# Patient Record
Sex: MALE | Race: OTHER | Employment: OTHER | URBAN - METROPOLITAN AREA
[De-identification: names, ages, dates, MRNs, and addresses within clinical notes are randomized per-mention and may not be internally consistent; named-entity substitution may affect disease eponyms.]

---

## 2019-03-13 ENCOUNTER — CATARACT CONSULT (OUTPATIENT)
Dept: URBAN - METROPOLITAN AREA CLINIC 39 | Facility: CLINIC | Age: 74
End: 2019-03-13

## 2019-03-13 VITALS
HEART RATE: 66 BPM | SYSTOLIC BLOOD PRESSURE: 138 MMHG | RESPIRATION RATE: 14 BRPM | HEIGHT: 60 IN | DIASTOLIC BLOOD PRESSURE: 67 MMHG

## 2019-03-13 DIAGNOSIS — H35.371: ICD-10-CM

## 2019-03-13 DIAGNOSIS — H43.813: ICD-10-CM

## 2019-03-13 DIAGNOSIS — H25.811: ICD-10-CM

## 2019-03-13 DIAGNOSIS — H25.812: ICD-10-CM

## 2019-03-13 PROCEDURE — 92014 COMPRE OPH EXAM EST PT 1/>: CPT

## 2019-03-13 PROCEDURE — V2799I IMPRIMIS

## 2019-03-13 PROCEDURE — 92015 DETERMINE REFRACTIVE STATE: CPT

## 2019-03-13 PROCEDURE — 92136TC INTERFEROMETRY - TECHNICAL COMPONENT

## 2019-03-13 PROCEDURE — 92025-2 CORNEAL TOPOGRAPHY, PT

## 2019-03-13 PROCEDURE — 92134 CPTRZ OPH DX IMG PST SGM RTA: CPT

## 2019-03-13 RX ORDER — BROMFENAC SODIUM 0.7 MG/ML: 1 SOLUTION/ DROPS OPHTHALMIC TWICE A DAY

## 2019-03-13 ASSESSMENT — TONOMETRY
OD_IOP_MMHG: 13
OS_IOP_MMHG: 13

## 2019-03-13 ASSESSMENT — VISUAL ACUITY
OD_SC: 20/25
OD_SC: J10-
OD_CC: J1
OS_SC: 20/70+1
OS_SC: J12-
OS_CC: J8

## 2019-03-28 ENCOUNTER — SURGERY/PROCEDURE (OUTPATIENT)
Dept: URBAN - METROPOLITAN AREA CLINIC 39 | Facility: CLINIC | Age: 74
End: 2019-03-28

## 2019-03-28 ENCOUNTER — PRE-OP/H&P (OUTPATIENT)
Dept: URBAN - METROPOLITAN AREA SURGERY 14 | Facility: SURGERY | Age: 74
End: 2019-03-28

## 2019-03-28 DIAGNOSIS — H25.811: ICD-10-CM

## 2019-03-28 DIAGNOSIS — H43.813: ICD-10-CM

## 2019-03-28 DIAGNOSIS — Z97.3: ICD-10-CM

## 2019-03-28 DIAGNOSIS — H25.812: ICD-10-CM

## 2019-03-28 PROCEDURE — 99499 UNLISTED E&M SERVICE: CPT

## 2019-03-28 PROCEDURE — 66999LNSR LENSAR LASER FOR CAT SX

## 2019-03-28 PROCEDURE — 66984CV REMOVE CATARACT, INSERT LENS, CUSTOM VISION

## 2019-03-29 ENCOUNTER — CATARACT POST-OP 1-DAY (OUTPATIENT)
Dept: URBAN - METROPOLITAN AREA CLINIC 39 | Facility: CLINIC | Age: 74
End: 2019-03-29

## 2019-03-29 DIAGNOSIS — Z96.1: ICD-10-CM

## 2019-03-29 PROCEDURE — 99024 POSTOP FOLLOW-UP VISIT: CPT

## 2019-03-29 ASSESSMENT — TONOMETRY: OD_IOP_MMHG: 14

## 2019-03-29 ASSESSMENT — VISUAL ACUITY: OD_SC: 20/40+1

## 2019-04-02 ENCOUNTER — POST OP/EVAL OF SECOND EYE (OUTPATIENT)
Dept: URBAN - METROPOLITAN AREA CLINIC 39 | Facility: CLINIC | Age: 74
End: 2019-04-02

## 2019-04-02 DIAGNOSIS — H25.812: ICD-10-CM

## 2019-04-02 DIAGNOSIS — Z96.1: ICD-10-CM

## 2019-04-02 PROCEDURE — 99024 POSTOP FOLLOW-UP VISIT: CPT

## 2019-04-02 PROCEDURE — 92012 INTRM OPH EXAM EST PATIENT: CPT

## 2019-04-02 ASSESSMENT — VISUAL ACUITY
OS_PH: 20/30+2
OD_SC: 20/20
OS_SC: 20/70
OD_SC: J12
OS_SC: >J12
OS_CC: J8

## 2019-04-02 ASSESSMENT — TONOMETRY
OD_IOP_MMHG: 10
OS_IOP_MMHG: 13

## 2019-04-04 ENCOUNTER — SURGERY/PROCEDURE (OUTPATIENT)
Dept: URBAN - METROPOLITAN AREA CLINIC 39 | Facility: CLINIC | Age: 74
End: 2019-04-04

## 2019-04-04 ENCOUNTER — PRE-OP/H&P (OUTPATIENT)
Dept: URBAN - METROPOLITAN AREA SURGERY 14 | Facility: SURGERY | Age: 74
End: 2019-04-04

## 2019-04-04 DIAGNOSIS — H35.371: ICD-10-CM

## 2019-04-04 DIAGNOSIS — H25.812: ICD-10-CM

## 2019-04-04 DIAGNOSIS — H43.813: ICD-10-CM

## 2019-04-04 DIAGNOSIS — H26.491: ICD-10-CM

## 2019-04-04 PROCEDURE — 65772LRI LRI DURING CAT SX

## 2019-04-04 PROCEDURE — 99499 UNLISTED E&M SERVICE: CPT

## 2019-04-04 PROCEDURE — 66984CV REMOVE CATARACT, INSERT LENS, CUSTOM VISION

## 2019-04-04 PROCEDURE — 66999LNSR LENSAR LASER FOR CAT SX

## 2019-04-05 ENCOUNTER — CATARACT POST-OP 1-DAY (OUTPATIENT)
Dept: URBAN - METROPOLITAN AREA CLINIC 39 | Facility: CLINIC | Age: 74
End: 2019-04-05

## 2019-04-05 DIAGNOSIS — Z96.1: ICD-10-CM

## 2019-04-05 DIAGNOSIS — H26.492: ICD-10-CM

## 2019-04-05 DIAGNOSIS — H26.491: ICD-10-CM

## 2019-04-05 PROCEDURE — 99024 POSTOP FOLLOW-UP VISIT: CPT

## 2019-04-05 ASSESSMENT — TONOMETRY
OS_IOP_MMHG: 10
OD_IOP_MMHG: 11

## 2019-04-05 ASSESSMENT — VISUAL ACUITY
OS_SC: 20/200
OD_SC: 20/20
OU_SC: J1+
OS_SC: J1+
OD_SC: J6
OU_SC: 20/20

## 2019-05-07 ENCOUNTER — POST-OP CATARACT (OUTPATIENT)
Dept: URBAN - METROPOLITAN AREA CLINIC 39 | Facility: CLINIC | Age: 74
End: 2019-05-07

## 2019-05-07 DIAGNOSIS — Z96.1: ICD-10-CM

## 2019-05-07 DIAGNOSIS — H26.492: ICD-10-CM

## 2019-05-07 DIAGNOSIS — H26.491: ICD-10-CM

## 2019-05-07 PROCEDURE — 99024 POSTOP FOLLOW-UP VISIT: CPT

## 2019-05-07 ASSESSMENT — VISUAL ACUITY
OU_SC: 20/20
OD_SC: J8
OD_SC: 20/20
OS_SC: J1
OU_SC: J1
OS_SC: 20/200

## 2019-05-07 ASSESSMENT — TONOMETRY
OS_IOP_MMHG: 10
OD_IOP_MMHG: 10

## 2020-01-22 NOTE — PATIENT DISCUSSION
BLEPHARITIS, OU: PRESCRIBE WARM COMPRESSES AND EYELID SCRUBS QD-BID, ARTIFICIAL TEARS BID-QID OU. RETURN FOR FOLLOW-UP AS SCHEDULED.

## 2022-11-08 ENCOUNTER — COMPREHENSIVE EXAM (OUTPATIENT)
Dept: URBAN - METROPOLITAN AREA CLINIC 42 | Facility: CLINIC | Age: 77
End: 2022-11-08

## 2022-11-08 DIAGNOSIS — H26.493: ICD-10-CM

## 2022-11-08 DIAGNOSIS — H35.371: ICD-10-CM

## 2022-11-08 DIAGNOSIS — Z96.1: ICD-10-CM

## 2022-11-08 DIAGNOSIS — H43.813: ICD-10-CM

## 2022-11-08 PROCEDURE — 92015 DETERMINE REFRACTIVE STATE: CPT

## 2022-11-08 PROCEDURE — 92014 COMPRE OPH EXAM EST PT 1/>: CPT

## 2022-11-08 ASSESSMENT — KERATOMETRY
OD_AXISANGLE2_DEGREES: 158
OS_AXISANGLE2_DEGREES: 32
OD_K1POWER_DIOPTERS: 41.75
OD_K2POWER_DIOPTERS: 41.50
OD_AXISANGLE_DEGREES: 68
OS_AXISANGLE_DEGREES: 122
OS_K1POWER_DIOPTERS: 42.75
OS_K2POWER_DIOPTERS: 42.50

## 2022-11-08 ASSESSMENT — VISUAL ACUITY
OU_SC: 20/25
OD_SC: 20/25
OS_SC: 20/20
OU_SC: 20/20
OD_SC: 20/200
OS_SC: 20/100

## 2022-11-08 ASSESSMENT — TONOMETRY
OS_IOP_MMHG: 10
OD_IOP_MMHG: 8

## 2023-11-14 ENCOUNTER — COMPREHENSIVE EXAM (OUTPATIENT)
Dept: URBAN - METROPOLITAN AREA CLINIC 42 | Facility: CLINIC | Age: 78
End: 2023-11-14

## 2023-11-14 DIAGNOSIS — Z96.1: ICD-10-CM

## 2023-11-14 DIAGNOSIS — H43.813: ICD-10-CM

## 2023-11-14 DIAGNOSIS — H26.493: ICD-10-CM

## 2023-11-14 DIAGNOSIS — H35.371: ICD-10-CM

## 2023-11-14 PROCEDURE — 92014 COMPRE OPH EXAM EST PT 1/>: CPT

## 2023-11-14 PROCEDURE — 92015 DETERMINE REFRACTIVE STATE: CPT

## 2023-11-14 ASSESSMENT — KERATOMETRY
OD_K2POWER_DIOPTERS: 41.50
OD_K1POWER_DIOPTERS: 42.25
OD_AXISANGLE2_DEGREES: 158
OD_K2POWER_DIOPTERS: 42.00
OS_K2POWER_DIOPTERS: 42.50
OS_K1POWER_DIOPTERS: 42.50
OS_AXISANGLE2_DEGREES: 32
OS_K2POWER_DIOPTERS: 41.75
OS_K1POWER_DIOPTERS: 42.75
OD_AXISANGLE_DEGREES: 68
OD_AXISANGLE_DEGREES: 62
OD_K1POWER_DIOPTERS: 41.75
OS_AXISANGLE_DEGREES: 122
OS_AXISANGLE2_DEGREES: 166
OS_AXISANGLE_DEGREES: 76
OD_AXISANGLE2_DEGREES: 152

## 2023-11-14 ASSESSMENT — VISUAL ACUITY
OD_SC: 20/25
OS_SC: 20/30

## 2023-11-14 ASSESSMENT — TONOMETRY
OS_IOP_MMHG: 10
OD_IOP_MMHG: 9

## 2024-11-19 ENCOUNTER — COMPREHENSIVE EXAM (OUTPATIENT)
Dept: URBAN - METROPOLITAN AREA CLINIC 42 | Facility: CLINIC | Age: 79
End: 2024-11-19

## 2024-11-19 DIAGNOSIS — H43.813: ICD-10-CM

## 2024-11-19 DIAGNOSIS — H35.371: ICD-10-CM

## 2024-11-19 DIAGNOSIS — H26.493: ICD-10-CM

## 2024-11-19 DIAGNOSIS — Z96.1: ICD-10-CM

## 2024-11-19 PROCEDURE — 92015 DETERMINE REFRACTIVE STATE: CPT

## 2024-11-19 PROCEDURE — 92014 COMPRE OPH EXAM EST PT 1/>: CPT
